# Patient Record
Sex: MALE | Race: WHITE | NOT HISPANIC OR LATINO | URBAN - METROPOLITAN AREA
[De-identification: names, ages, dates, MRNs, and addresses within clinical notes are randomized per-mention and may not be internally consistent; named-entity substitution may affect disease eponyms.]

---

## 2018-11-05 ENCOUNTER — APPOINTMENT (RX ONLY)
Dept: URBAN - METROPOLITAN AREA CLINIC 10 | Facility: CLINIC | Age: 33
Setting detail: DERMATOLOGY
End: 2018-11-05

## 2018-11-05 DIAGNOSIS — L30.9 DERMATITIS, UNSPECIFIED: ICD-10-CM

## 2018-11-05 PROCEDURE — 99202 OFFICE O/P NEW SF 15 MIN: CPT

## 2018-11-05 PROCEDURE — ? PRESCRIPTION

## 2018-11-05 PROCEDURE — ? COUNSELING

## 2018-11-05 RX ORDER — TRIAMCINOLONE ACETONIDE 1 MG/G
CREAM TOPICAL BID
Qty: 1 | Refills: 3 | COMMUNITY
Start: 2018-11-05

## 2018-11-05 RX ADMIN — TRIAMCINOLONE ACETONIDE: 1 CREAM TOPICAL at 15:37

## 2018-11-05 ASSESSMENT — LOCATION DETAILED DESCRIPTION DERM
LOCATION DETAILED: MID POSTERIOR NECK
LOCATION DETAILED: LEFT DISTAL DORSAL FOREARM
LOCATION DETAILED: RIGHT DISTAL DORSAL FOREARM

## 2018-11-05 ASSESSMENT — LOCATION SIMPLE DESCRIPTION DERM
LOCATION SIMPLE: LEFT FOREARM
LOCATION SIMPLE: POSTERIOR NECK
LOCATION SIMPLE: RIGHT FOREARM

## 2018-11-05 ASSESSMENT — LOCATION ZONE DERM
LOCATION ZONE: ARM
LOCATION ZONE: NECK

## 2018-11-05 NOTE — HPI: RASH
What Type Of Note Output Would You Prefer (Optional)?: Standard Output
Is This A New Presentation, Or A Follow-Up?: Rash
Additional History: Pt notes that cooling gel helps with itchiness. Pt notes he thought his dog may have been causing his issue but it doesn’t seem to correlate with time spent with his dog.